# Patient Record
Sex: FEMALE | Race: WHITE
[De-identification: names, ages, dates, MRNs, and addresses within clinical notes are randomized per-mention and may not be internally consistent; named-entity substitution may affect disease eponyms.]

---

## 2019-07-11 ENCOUNTER — HOSPITAL ENCOUNTER (EMERGENCY)
Dept: HOSPITAL 36 - ER | Age: 34
Discharge: HOME | End: 2019-07-11
Payer: SELF-PAY
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
## 2019-07-11 DIAGNOSIS — M54.9: Primary | ICD-10-CM

## 2019-07-11 DIAGNOSIS — Z91.013: ICD-10-CM

## 2019-07-11 PROCEDURE — 96372 THER/PROPH/DIAG INJ SC/IM: CPT

## 2019-07-11 PROCEDURE — Z7502: HCPCS

## 2019-07-11 PROCEDURE — 99283 EMERGENCY DEPT VISIT LOW MDM: CPT

## 2019-07-11 PROCEDURE — 81025 URINE PREGNANCY TEST: CPT
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
